# Patient Record
Sex: MALE | Race: OTHER | HISPANIC OR LATINO | Employment: FULL TIME | URBAN - METROPOLITAN AREA
[De-identification: names, ages, dates, MRNs, and addresses within clinical notes are randomized per-mention and may not be internally consistent; named-entity substitution may affect disease eponyms.]

---

## 2023-11-30 ENCOUNTER — HOSPITAL ENCOUNTER (EMERGENCY)
Facility: HOSPITAL | Age: 43
Discharge: LEFT BEFORE DISCHARGE | End: 2023-12-01
Attending: EMERGENCY MEDICINE

## 2023-11-30 VITALS
HEIGHT: 69 IN | HEART RATE: 103 BPM | WEIGHT: 230 LBS | SYSTOLIC BLOOD PRESSURE: 151 MMHG | TEMPERATURE: 98.7 F | OXYGEN SATURATION: 96 % | RESPIRATION RATE: 18 BRPM | BODY MASS INDEX: 34.07 KG/M2 | DIASTOLIC BLOOD PRESSURE: 78 MMHG

## 2023-11-30 DIAGNOSIS — R06.02 SHORTNESS OF BREATH: Primary | ICD-10-CM

## 2023-11-30 PROCEDURE — 99283 EMERGENCY DEPT VISIT LOW MDM: CPT

## 2023-11-30 ASSESSMENT — PAIN - FUNCTIONAL ASSESSMENT: PAIN_FUNCTIONAL_ASSESSMENT: NONE - DENIES PAIN

## 2023-12-01 ENCOUNTER — APPOINTMENT (OUTPATIENT)
Facility: HOSPITAL | Age: 43
End: 2023-12-01

## 2023-12-01 PROCEDURE — 71045 X-RAY EXAM CHEST 1 VIEW: CPT

## 2023-12-01 RX ORDER — MAGNESIUM SULFATE IN WATER 40 MG/ML
2000 INJECTION, SOLUTION INTRAVENOUS
Status: DISCONTINUED | OUTPATIENT
Start: 2023-12-01 | End: 2023-12-01 | Stop reason: HOSPADM

## 2023-12-01 RX ORDER — 0.9 % SODIUM CHLORIDE 0.9 %
1000 INTRAVENOUS SOLUTION INTRAVENOUS ONCE
Status: DISCONTINUED | OUTPATIENT
Start: 2023-12-01 | End: 2023-12-01 | Stop reason: HOSPADM

## 2023-12-01 RX ORDER — DEXAMETHASONE SODIUM PHOSPHATE 10 MG/ML
10 INJECTION INTRAMUSCULAR; INTRAVENOUS
Status: DISCONTINUED | OUTPATIENT
Start: 2023-12-01 | End: 2023-12-01 | Stop reason: HOSPADM

## 2023-12-01 NOTE — ED TRIAGE NOTES
SOB x today. Hx of asthma. Notes being seen at urgent care today and diagnosed w/ bronchitis. Notes chest XR, covid, flu and strep test done w/ negative results. Given amoxicillin and prednisone today. Reports increase of mucus build up and difficulty breathing. Sats WDL. Does not appear to be in respiratory distress. No audible wheezing noted. Reports using albuterol inhaler 30 min PTA and neb treatment around 8 pm w/ little relief.

## 2023-12-01 NOTE — ED NOTES
Pt left ED after receiving estimated cost print out from registration  This RN explained to wife if pt's symptoms worsen to call 911 or take pt to [facility] Sale[facility]., RN  12/01/23 [id number]

## 2023-12-01 NOTE — ED PROVIDER NOTES
[facility] EMERGENCY DEPT  EMERGENCY DEPARTMENT ENCOUNTER       Pt Name: Tatiana Padilla  MRN: [id number]  [address] 1980  Date of evaluation: 11/30/2023  Provider: Leonid Patricio MD   PCP: No primary care provider on file. Note Started: 6:21 AM 12/1/23     CHIEF COMPLAINT       Chief Complaint   Patient presents with    Shortness of Breath        HISTORY OF PRESENT ILLNESS: 1 or more elements      History From: patient, History limited by:  none     Tatiana Padilla is a 41 y.o. male who presents with cc of wheeze and dyspnea in the setting of uri symptoms since yesterday. Patient states his mother-in-law flew in from [place] and was staying with him and was sick all week. His wife also got sick. And then patient got sick himself. Began with a runny nose, wheeze, cough productive of green phlegm yesterday. He woke up this morning and felt like he could not breathe. He states it feels like someone is hugging and squeezing his chest and he cannot get a big breath. [facility] and [facility] with [facility] after negative chest x-ray, COVID, flu test.  He was prescribed prednisone and amoxicillin. [facility] did not get any treatment [facility].  He took his first prednisone dose at 10 AM.  He feels that his chest tightness has not improved significantly. Feels winded. Denies fever or chest pain. Feels like he has a headache related to cough. Nursing Notes were all reviewed and agreed with or any disagreements were addressed in the HPI. REVIEW OF SYSTEMS        Positives and Pertinent negatives as per HPI. PAST HISTORY     Past Medical History:  No past medical history on file. Past Surgical History:  No past surgical history on file. Family History:  No family history on file. Social History: Allergies: Allergies   Allergen Reactions    Sulfa Antibiotics Hives       CURRENT MEDICATIONS      There are no discharge medications for this patient.       SCREENINGS